# Patient Record
Sex: MALE | Race: WHITE | Employment: OTHER | URBAN - METROPOLITAN AREA
[De-identification: names, ages, dates, MRNs, and addresses within clinical notes are randomized per-mention and may not be internally consistent; named-entity substitution may affect disease eponyms.]

---

## 2020-01-27 RX ORDER — ZOLPIDEM TARTRATE 10 MG/1
TABLET ORAL NIGHTLY PRN
COMMUNITY

## 2020-01-27 RX ORDER — OLMESARTAN MEDOXOMIL AND HYDROCHLOROTHIAZIDE 20/12.5 20; 12.5 MG/1; MG/1
1 TABLET ORAL DAILY
COMMUNITY

## 2020-01-27 RX ORDER — AMLODIPINE BESYLATE 5 MG/1
5 TABLET ORAL DAILY
COMMUNITY

## 2020-01-27 RX ORDER — CHOLECALCIFEROL (VITAMIN D3) 125 MCG
5 CAPSULE ORAL NIGHTLY
COMMUNITY

## 2020-01-27 NOTE — PROGRESS NOTES
Name_______________________________________Printed:____________________  Date and time of surgery__1/31/2020  1200______________________Arrival Time:___[facility]  1030_____________   1. The instructions given regarding when and if a patient needs to stop oral intake prior to surgery varies. Follow the specific instructions you were given                XXX  ___Nothing to eat or to drink after Midnight the night before.                             ____Endoscopy patient follow your DRS instructions-generally you will be doing a part of the prep after Midnight                   ____Carbo loading or ERAS instructions will be given to select patients-if you have been given those instructions -please do the following                           The evening before your surgery after dinner before midnight drink 2 20 ounces of gatorade. If you are diabetic use sugar free. The morning of surgery drink 40 ounces of water. This needs to be finished 3 hours prior to your surgery start time. 2. Take the following pills with a small sip of water on the morning of surgery________amlodipine, benicar hct___________________________________________                  Do not take blood pressure medications ending in pril or sartan the leonardo prior to surgery or the morning of surgery_   3. Aspirin, Ibuprofen, Advil, Naproxen, Vitamin E and other Anti-inflammatory products and supplements should be stopped for 5 -7days before surgery or as directed by your physician. 4. Check with your Doctor regarding stopping Plavix, Coumadin,Eliquis, Lovenox,Effient,Pradaxa,Xarelto, Fragmin or other blood thinners and follow their instructions. 5. Do not smoke, and do not drink any alcoholic beverages 24 hours prior to surgery. This includes NA Beer. Refrain from the usage of any recreational drugs. 6. You may brush your teeth and gargle the morning of surgery. DO NOT SWALLOW WATER   7.  You MUST make arrangements for a responsible adult to stay on site

## 2020-01-31 ENCOUNTER — ANESTHESIA (OUTPATIENT)
Dept: ENDOSCOPY | Age: 85
End: 2020-01-31
Payer: MEDICARE

## 2020-01-31 ENCOUNTER — ANESTHESIA EVENT (OUTPATIENT)
Dept: ENDOSCOPY | Age: 85
End: 2020-01-31
Payer: MEDICARE

## 2020-01-31 ENCOUNTER — HOSPITAL ENCOUNTER (OUTPATIENT)
Age: 85
Setting detail: OUTPATIENT SURGERY
Discharge: HOME | End: 2020-01-31
Attending: INTERNAL MEDICINE | Admitting: INTERNAL MEDICINE
Payer: MEDICARE

## 2020-01-31 VITALS
WEIGHT: 175 LBS | DIASTOLIC BLOOD PRESSURE: 83 MMHG | RESPIRATION RATE: 16 BRPM | HEART RATE: 73 BPM | BODY MASS INDEX: 25.05 KG/M2 | HEIGHT: 70 IN | SYSTOLIC BLOOD PRESSURE: 184 MMHG | OXYGEN SATURATION: 100 % | TEMPERATURE: 97.3 F

## 2020-01-31 VITALS
RESPIRATION RATE: 12 BRPM | SYSTOLIC BLOOD PRESSURE: 152 MMHG | OXYGEN SATURATION: 98 % | DIASTOLIC BLOOD PRESSURE: 69 MMHG

## 2020-01-31 LAB
GLUCOSE BLD-MCNC: 122 MG/DL (ref 70–99)
GLUCOSE BLD-MCNC: 143 MG/DL (ref 70–99)
PERFORMED ON: ABNORMAL
PERFORMED ON: ABNORMAL

## 2020-01-31 PROCEDURE — 3700000000 HC ANESTHESIA ATTENDED CARE: Performed by: INTERNAL MEDICINE

## 2020-01-31 PROCEDURE — 7100000011 HC PHASE II RECOVERY - ADDTL 15 MIN: Performed by: INTERNAL MEDICINE

## 2020-01-31 PROCEDURE — 7100000010 HC PHASE II RECOVERY - FIRST 15 MIN: Performed by: INTERNAL MEDICINE

## 2020-01-31 PROCEDURE — 2500000003 HC RX 250 WO HCPCS: Performed by: NURSE ANESTHETIST, CERTIFIED REGISTERED

## 2020-01-31 PROCEDURE — 3609012400 HC EGD TRANSORAL BIOPSY SINGLE/MULTIPLE: Performed by: INTERNAL MEDICINE

## 2020-01-31 PROCEDURE — 3700000001 HC ADD 15 MINUTES (ANESTHESIA): Performed by: INTERNAL MEDICINE

## 2020-01-31 PROCEDURE — 88305 TISSUE EXAM BY PATHOLOGIST: CPT

## 2020-01-31 PROCEDURE — 2709999900 HC NON-CHARGEABLE SUPPLY: Performed by: INTERNAL MEDICINE

## 2020-01-31 PROCEDURE — 6360000002 HC RX W HCPCS: Performed by: NURSE ANESTHETIST, CERTIFIED REGISTERED

## 2020-01-31 PROCEDURE — 2580000003 HC RX 258: Performed by: ANESTHESIOLOGY

## 2020-01-31 RX ORDER — PROPOFOL 10 MG/ML
INJECTION, EMULSION INTRAVENOUS PRN
Status: DISCONTINUED | OUTPATIENT
Start: 2020-01-31 | End: 2020-01-31 | Stop reason: SDUPTHER

## 2020-01-31 RX ORDER — GLYCOPYRROLATE 0.2 MG/ML
INJECTION INTRAMUSCULAR; INTRAVENOUS PRN
Status: DISCONTINUED | OUTPATIENT
Start: 2020-01-31 | End: 2020-01-31 | Stop reason: SDUPTHER

## 2020-01-31 RX ORDER — SODIUM CHLORIDE 9 MG/ML
INJECTION, SOLUTION INTRAVENOUS CONTINUOUS
Status: DISCONTINUED | OUTPATIENT
Start: 2020-01-31 | End: 2020-01-31 | Stop reason: HOSPADM

## 2020-01-31 RX ORDER — OMEPRAZOLE 40 MG/1
40 CAPSULE, DELAYED RELEASE ORAL
Qty: 90 CAPSULE | Refills: 3 | Status: SHIPPED | OUTPATIENT
Start: 2020-01-31

## 2020-01-31 RX ORDER — LIDOCAINE HYDROCHLORIDE 20 MG/ML
INJECTION, SOLUTION INFILTRATION; PERINEURAL PRN
Status: DISCONTINUED | OUTPATIENT
Start: 2020-01-31 | End: 2020-01-31 | Stop reason: SDUPTHER

## 2020-01-31 RX ADMIN — PROPOFOL 30 MG: 10 INJECTION, EMULSION INTRAVENOUS at 12:28

## 2020-01-31 RX ADMIN — GLYCOPYRROLATE 0.2 MG: 0.2 INJECTION INTRAMUSCULAR; INTRAVENOUS at 12:23

## 2020-01-31 RX ADMIN — LIDOCAINE HYDROCHLORIDE 100 MG: 20 INJECTION, SOLUTION INFILTRATION; PERINEURAL at 12:24

## 2020-01-31 RX ADMIN — SODIUM CHLORIDE: 9 INJECTION, SOLUTION INTRAVENOUS at 10:57

## 2020-01-31 RX ADMIN — PROPOFOL 30 MG: 10 INJECTION, EMULSION INTRAVENOUS at 12:34

## 2020-01-31 RX ADMIN — PROPOFOL 30 MG: 10 INJECTION, EMULSION INTRAVENOUS at 12:25

## 2020-01-31 RX ADMIN — PROPOFOL 30 MG: 10 INJECTION, EMULSION INTRAVENOUS at 12:31

## 2020-01-31 ASSESSMENT — PAIN SCALES - GENERAL
PAINLEVEL_OUTOF10: 0

## 2020-01-31 ASSESSMENT — PAIN - FUNCTIONAL ASSESSMENT: PAIN_FUNCTIONAL_ASSESSMENT: 0-10

## 2020-01-31 ASSESSMENT — ENCOUNTER SYMPTOMS: SHORTNESS OF BREATH: 0

## 2020-01-31 NOTE — H&P
[address] [place] [address]   Pre-operative History and Physical    Patient: Diamond Golden  : 1929  CSN:     History Obtained From:  patient    HISTORY OF PRESENT ILLNESS:    The patient is a 80 y.o. male with dysphagia for solids x 1.5y    Past Medical History:        Diagnosis Date    Degenerative disc disease, lumbar     Diabetes mellitus ([address]     Hyperlipidemia     Hypertension     Insomnia     Urinary incontinence      Past Surgical History:        Procedure Laterality Date    BACK SURGERY      disectomy x 2    CATARACT REMOVAL WITH IMPLANT Bilateral     LUNG REMOVAL, PARTIAL      due to TB    MOUTH SURGERY      NASAL SINUS SURGERY      polypectomy    TESTICLE REMOVAL      TONSILLECTOMY      TURP       Medications Prior to Admission:   No current facility-administered medications on file prior to encounter. Current Outpatient Medications on File Prior to Encounter   Medication Sig Dispense Refill    metFORMIN (GLUCOPHAGE) 500 MG tablet Take 500 mg by mouth 2 times daily (with meals)      linagliptin (TRADJENTA) 5 MG tablet Take 5 mg by mouth daily      amLODIPine (NORVASC) 5 MG tablet Take 5 mg by mouth daily      olmesartan-hydrochlorothiazide (BENICAR HCT) 20-12.5 MG per tablet Take 1 tablet by mouth daily      zolpidem (AMBIEN) 10 MG tablet Take by mouth nightly as needed for Sleep.  melatonin 5 MG TABS tablet Take 5 mg by mouth nightly      NONFORMULARY [employer] all day allergy 10mg daily      aspirin 81 MG tablet Take 81 mg by mouth daily       Allergies:  Actos [pioglitazone]; Kombiglyze xr [saxagliptin-metformin er]; Lipitor [atorvastatin calcium]; and Onglyza [saxagliptin]    History of allergic reaction to anesthesia:  No    Social History:   Reviewed   Family History:   History reviewed. No pertinent family history.     PHYSICAL EXAM:      BP (!) 178/69   Pulse 74   Temp 97.2 °F (36.2 °C) (Temporal)   Resp 18   Ht 5' 9.5\" (1.765 m)   Wt 175 lb (79.4 kg) SpO2 99%   BMI 25.47 kg/m²  I        Heart:  Normal apical impulse, regular rate and rhythm, normal S1 and S2, no S3 or S4, and no murmur noted    Lungs:  No increased work of breathing, good air exchange, clear to auscultation bilaterally, no crackles or wheezing    Abdomen:  No scars, normal bowel sounds, soft, non-distended, non-tender, no masses palpated, no hepatosplenomegally      ASA Grade:  ASA 2 - Patient with mild systemic disease with no functional limitations    Mallampati Class:  Class I: Soft palate, uvula, fauces, pillars visible  __x________  Class II: Soft palate, uvula, fauces visible  __________   Class III: Soft palate, base of uvula visible  __________  Class IV: Hard palate only visible   __________      ASSESSMENT AND PLAN:    1. Patient is a 80 y.o. male here for EGD with anesthesia  2. Procedure options, risks and benefits reviewed with patient. Patient expresses understanding.

## 2020-01-31 NOTE — ANESTHESIA PRE PROCEDURE
Department of Anesthesiology  Preprocedure Note       Name:  Scot Santos   Age:  80 y.o.  :  1929                                          MRN:  [id number]         Date:  2020      Surgeon: Samantha Carlson):  Carlos Alberto Rico MD    Procedure: EGD DIAGNOSTIC ONLY (N/A Abdomen)    Medications prior to admission:   Prior to Admission medications    Medication Sig Start Date End Date Taking? Authorizing Provider   metFORMIN (GLUCOPHAGE) 500 MG tablet Take 500 mg by mouth 2 times daily (with meals)   Yes Historical Provider, MD   linagliptin (TRADJENTA) 5 MG tablet Take 5 mg by mouth daily   Yes Historical Provider, MD   amLODIPine (NORVASC) 5 MG tablet Take 5 mg by mouth daily   Yes Historical Provider, MD   olmesartan-hydrochlorothiazide (BENICAR HCT) 20-12.5 MG per tablet Take 1 tablet by mouth daily   Yes Historical Provider, MD   zolpidem (AMBIEN) 10 MG tablet Take by mouth nightly as needed for Sleep. Yes Historical Provider, MD   melatonin 5 MG TABS tablet Take 5 mg by mouth nightly   Yes Historical Provider, MD   NONFORMULARY [employer] all day allergy 10mg daily    Historical Provider, MD   aspirin 81 MG tablet Take 81 mg by mouth daily    Historical Provider, MD       Current medications:    Current Facility-Administered Medications   Medication Dose Route Frequency Provider Last Rate Last Dose    0.9 % sodium chloride infusion   Intravenous Continuous Torito Cabrera  mL/hr at 20 1057         Allergies: Allergies   Allergen Reactions    Actos [Pioglitazone]      Feet swelling    Kombiglyze Xr [Saxagliptin-Metformin Er]      Feet swelling    Lipitor [Atorvastatin Calcium]      Muscle weakness    Onglyza [Saxagliptin]      Feet swelling       Problem List:  There is no problem list on file for this patient.       Past Medical History:        Diagnosis Date    Degenerative disc disease, lumbar     Diabetes mellitus ([facility]     Hyperlipidemia     Hypertension     Insomnia

## 2020-01-31 NOTE — ANESTHESIA POSTPROCEDURE EVALUATION
Department of Anesthesiology  Postprocedure Note    Patient: Mallory Saavedra  MRN: [id number]  YOB: 1929  Date of evaluation: 1/31/2020  Time:  12:58 PM     Procedure Summary     Date:  01/31/20 Room / Location:  [address] [place] [address]    Anesthesia Start:  0973 Anesthesia Stop:  2995    Procedure:  EGD BIOPSY (N/A Abdomen) Diagnosis:  (ESOPHAGEAL DYSPHAGIA R13.10)    Surgeon:  Eddie Cool MD Responsible Provider:  Ruth Muñoz MD    Anesthesia Type:  MAC ASA Status:  2          Anesthesia Type: MAC    Angelia Phase I: Angelia Score: 10    Angelia Phase II:      Last vitals: Reviewed and per EMR flowsheets.        Anesthesia Post Evaluation    Patient location during evaluation: PACU  Patient participation: complete - patient participated  Level of consciousness: awake and alert  Airway patency: patent  Nausea & Vomiting: no nausea and no vomiting  Complications: no  Cardiovascular status: hemodynamically stable  Respiratory status: acceptable  Hydration status: stable

## 2020-09-11 ENCOUNTER — HOSPITAL ENCOUNTER (EMERGENCY)
Age: 85
Discharge: HOME | End: 2020-09-11
Attending: EMERGENCY MEDICINE
Payer: MEDICARE

## 2020-09-11 VITALS
RESPIRATION RATE: 20 BRPM | WEIGHT: 162 LBS | OXYGEN SATURATION: 99 % | SYSTOLIC BLOOD PRESSURE: 160 MMHG | DIASTOLIC BLOOD PRESSURE: 84 MMHG | HEIGHT: 70 IN | HEART RATE: 76 BPM | TEMPERATURE: 97.6 F | BODY MASS INDEX: 23.19 KG/M2

## 2020-09-11 PROCEDURE — 99282 EMERGENCY DEPT VISIT SF MDM: CPT

## 2020-09-11 PROCEDURE — 6370000000 HC RX 637 (ALT 250 FOR IP): Performed by: EMERGENCY MEDICINE

## 2020-09-11 RX ORDER — TETRACAINE HYDROCHLORIDE 5 MG/ML
1 SOLUTION OPHTHALMIC ONCE
Status: COMPLETED | OUTPATIENT
Start: 2020-09-11 | End: 2020-09-11

## 2020-09-11 RX ORDER — CIPROFLOXACIN HYDROCHLORIDE 3.5 MG/ML
1 SOLUTION/ DROPS TOPICAL EVERY 4 HOURS
Qty: 42 DROP | Refills: 0 | Status: SHIPPED | OUTPATIENT
Start: 2020-09-11 | End: 2020-09-18

## 2020-09-11 RX ADMIN — FLUORESCEIN SODIUM 1 MG: 1 STRIP OPHTHALMIC at 12:42

## 2020-09-11 RX ADMIN — TETRACAINE HYDROCHLORIDE 1 DROP: 5 SOLUTION OPHTHALMIC at 12:42

## 2020-09-11 ASSESSMENT — VISUAL ACUITY
OU: 20/30
OD: 20/40
OS: 20/50

## 2020-09-11 ASSESSMENT — PAIN SCALES - GENERAL
PAINLEVEL_OUTOF10: 2
PAINLEVEL_OUTOF10: 0

## 2020-09-11 ASSESSMENT — PAIN DESCRIPTION - PAIN TYPE: TYPE: ACUTE PAIN

## 2020-09-11 ASSESSMENT — PAIN - FUNCTIONAL ASSESSMENT: PAIN_FUNCTIONAL_ASSESSMENT: ACTIVITIES ARE NOT PREVENTED

## 2020-09-11 ASSESSMENT — PAIN DESCRIPTION - ORIENTATION: ORIENTATION: RIGHT

## 2020-09-11 ASSESSMENT — PAIN DESCRIPTION - FREQUENCY: FREQUENCY: CONTINUOUS

## 2020-09-11 ASSESSMENT — PAIN DESCRIPTION - LOCATION: LOCATION: EYE

## 2020-09-11 ASSESSMENT — PAIN DESCRIPTION - PROGRESSION: CLINICAL_PROGRESSION: NOT CHANGED

## 2020-09-11 NOTE — ED PROVIDER NOTES
CHIEF COMPLAINT  Chief Complaint   Patient presents with    Eye Problem     Irritated right eye for two weeks. Now the left eye is bothering him. HISTORY OF PRESENT ILLNESS  Valente Alanis is a 80 y.o. male who presents to the ED complaining of an irritated right eye for the last 2 weeks with increasing redness and watering but no photophobia. Patient reports that he was mowing the grass approximately 5 days ago when a branch hit him in the eye. He has a foreign body sensation in the right lower eye. There is no periorbital swelling or redness. No fevers or chills. No loss of vision, flashing lights or blind spots. No headache. No halos    No other complaints, modifying factors or associated symptoms. Nursing notes reviewed. Past Medical History:   Diagnosis Date    Degenerative disc disease, lumbar     Diabetes mellitus ([address]     Hyperlipidemia     Hypertension     Insomnia     Urinary incontinence      Past Surgical History:   Procedure Laterality Date    BACK SURGERY      disectomy x 2    CATARACT REMOVAL WITH IMPLANT Bilateral     LUNG REMOVAL, PARTIAL      due to TB    MOUTH SURGERY      NASAL SINUS SURGERY      polypectomy    TESTICLE REMOVAL      TONSILLECTOMY      TURP      UPPER GASTROINTESTINAL ENDOSCOPY N/A 1/31/2020    EGD BIOPSY performed by Sima Mcghee MD at [address] [place] [address]     History reviewed. No pertinent family history. Social History     Socioeconomic History    Marital status:       Spouse name: Not on file    Number of children: Not on file    Years of education: Not on file    Highest education level: Not on file   Occupational History    Not on file   Social Needs    Financial resource strain: Not on file    Food insecurity     Worry: Not on file     Inability: Not on file    Transportation needs     Medical: Not on file     Non-medical: Not on file   Tobacco Use    Smoking status: Former Smoker    Smokeless tobacco: Never Used Substance and Sexual Activity    Alcohol use: Yes     Alcohol/week: 6.0 standard drinks     Types: 5 Glasses of wine, 1 Shots of liquor per week     Comment: weekly    Drug use: Never    Sexual activity: Not on file   Lifestyle    Physical activity     Days per week: Not on file     Minutes per session: Not on file    Stress: Not on file   Relationships    Social connections     Talks on phone: Not on file     Gets together: Not on file     Attends Judaism service: Not on file     Active member of club or organization: Not on file     Attends meetings of clubs or organizations: Not on file     Relationship status: Not on file    Intimate partner violence     Fear of current or ex partner: Not on file     Emotionally abused: Not on file     Physically abused: Not on file     Forced sexual activity: Not on file   Other Topics Concern    Not on file   Social History Narrative    Not on file     No current facility-administered medications for this encounter. Current Outpatient Medications   Medication Sig Dispense Refill    ciprofloxacin (CILOXAN) 0.3 % ophthalmic solution Place 1 drop into the right eye every 4 hours for 7 days 42 drop 0    omeprazole (PRILOSEC) 40 MG delayed release capsule Take 1 capsule by mouth every morning (before breakfast) 90 capsule 3    metFORMIN (GLUCOPHAGE) 500 MG tablet Take 500 mg by mouth 2 times daily (with meals)      linagliptin (TRADJENTA) 5 MG tablet Take 5 mg by mouth daily      amLODIPine (NORVASC) 5 MG tablet Take 5 mg by mouth daily      olmesartan-hydrochlorothiazide (BENICAR HCT) 20-12.5 MG per tablet Take 1 tablet by mouth daily      zolpidem (AMBIEN) 10 MG tablet Take by mouth nightly as needed for Sleep.       aspirin 81 MG tablet Take 81 mg by mouth daily      melatonin 5 MG TABS tablet Take 5 mg by mouth nightly      NONFORMULARY [employer] all day allergy 10mg daily       Allergies   Allergen Reactions    Actos [Pioglitazone]      Feet swelling with the patient and advised to follow up with primary care physician to further assess and treat hypertension in the outpatient setting. The patient's blood pressure was found to be elevated according to CMS/Medicare and the Affordable Care Act/ObamaCare criteria. Elevated blood pressure could occur because of pain or anxiety or other reasons and does not mean that they need to have their blood pressure treated or medications otherwise adjusted. However, this could also be a sign that they will need to have their blood pressure treated or medications changed. The patient was instructed to take a list of recent blood pressure readings to their next visit with their personal physician. Patient was given scripts for the following medications. I counseled patient how to take these medications. New Prescriptions    CIPROFLOXACIN (CILOXAN) 0.3 % OPHTHALMIC SOLUTION    Place 1 drop into the right eye every 4 hours for 7 days         CLINICAL IMPRESSION  1. Abrasion of sclera of right eye, initial encounter        Blood pressure (!) 160/84, pulse 76, temperature 97.6 °F (36.4 °C), temperature source Oral, resp. rate 20, height 5' 10\" (1.778 m), weight 162 lb (73.5 kg), SpO2 99 %.       Follow-up with:  Karlie Nunez, [address] [place] [address]  [phone]    In 1 week  If symptoms worsen          Teresita Goncalves MD  09/11/20 9577

## 2020-09-11 NOTE — ED TRIAGE NOTES
Patient states he has had irritated feeling in his right eye for two weeks. Now the left eye is becoming irritated too. \"It feels like there is a hair in my right eye. \"  His eyes have been watering. More redness noted in the right eye.

## 2021-03-13 ENCOUNTER — APPOINTMENT (OUTPATIENT)
Dept: GENERAL RADIOLOGY | Age: 86
End: 2021-03-13
Payer: MEDICARE

## 2021-03-13 ENCOUNTER — HOSPITAL ENCOUNTER (EMERGENCY)
Age: 86
Discharge: HOME | End: 2021-03-13
Attending: EMERGENCY MEDICINE
Payer: MEDICARE

## 2021-03-13 VITALS
BODY MASS INDEX: 23.7 KG/M2 | WEIGHT: 160 LBS | HEART RATE: 88 BPM | DIASTOLIC BLOOD PRESSURE: 85 MMHG | SYSTOLIC BLOOD PRESSURE: 146 MMHG | HEIGHT: 69 IN | TEMPERATURE: 97.7 F | OXYGEN SATURATION: 99 % | RESPIRATION RATE: 18 BRPM

## 2021-03-13 DIAGNOSIS — R09.89 GLOBUS SENSATION: Primary | ICD-10-CM

## 2021-03-13 LAB — S PYO AG THROAT QL: NEGATIVE

## 2021-03-13 PROCEDURE — 99283 EMERGENCY DEPT VISIT LOW MDM: CPT

## 2021-03-13 PROCEDURE — 87081 CULTURE SCREEN ONLY: CPT

## 2021-03-13 PROCEDURE — 71046 X-RAY EXAM CHEST 2 VIEWS: CPT

## 2021-03-13 PROCEDURE — 87880 STREP A ASSAY W/OPTIC: CPT

## 2021-03-13 NOTE — ED PROVIDER NOTES
CHIEF COMPLAINT  Pharyngitis (onset this morning)      HISTORY OF PRESENT ILLNESS  Michelle Lechuga is a 80 y.o. male who presents to the ED complaining of sore throat that started this morning. Patient states he felt some scratchiness in the back of the throat and waking up. Denies any difficulty swallowing. States he was able to eat some coffee cake and drink some juice this morning. States he had to clear his throat this morning but denies any actual cough or sputum production. No chest pain or shortness of breath. No fevers or chills. Denies any known sick contacts. Patient states he is feeling anxious because his neighbor recently passed away and he thinks he is getting paranoid that he may be sick. No nausea or vomiting. No abdominal pain. Normal urinary and bowel habits. No medication changes. No rashes. No other complaints, modifying factors or associated symptoms. Nursing notes reviewed. Past Medical History:   Diagnosis Date    Degenerative disc disease, lumbar     Diabetes mellitus ([address]     Hyperlipidemia     Hypertension     Insomnia     Urinary incontinence      Past Surgical History:   Procedure Laterality Date    BACK SURGERY      disectomy x 2    CATARACT REMOVAL WITH IMPLANT Bilateral     LUNG REMOVAL, PARTIAL      due to TB    MOUTH SURGERY      NASAL SINUS SURGERY      polypectomy    TESTICLE REMOVAL      TONSILLECTOMY      TURP      UPPER GASTROINTESTINAL ENDOSCOPY N/A 1/31/2020    EGD BIOPSY performed by Blanca Dubon MD at [address]     History reviewed. No pertinent family history. Social History     Socioeconomic History    Marital status:       Spouse name: Not on file    Number of children: Not on file    Years of education: Not on file    Highest education level: Not on file   Occupational History    Not on file   Social Needs    Financial resource strain: Not on file    Food insecurity     Worry: Not on file     Inability: Not on file   [employer] needs     Medical: Not on file     Non-medical: Not on file   Tobacco Use    Smoking status: Former Smoker    Smokeless tobacco: Never Used   Substance and Sexual Activity    Alcohol use: Yes     Alcohol/week: 6.0 standard drinks     Types: 5 Glasses of wine, 1 Shots of liquor per week     Comment: weekly    Drug use: Never    Sexual activity: Not on file   Lifestyle    Physical activity     Days per week: Not on file     Minutes per session: Not on file    Stress: Not on file   Relationships    Social connections     Talks on phone: Not on file     Gets together: Not on file     Attends Orthodox service: Not on file     Active member of club or organization: Not on file     Attends meetings of clubs or organizations: Not on file     Relationship status: Not on file    Intimate partner violence     Fear of current or ex partner: Not on file     Emotionally abused: Not on file     Physically abused: Not on file     Forced sexual activity: Not on file   Other Topics Concern    Not on file   Social History Narrative    Not on file     No current facility-administered medications for this encounter. Current Outpatient Medications   Medication Sig Dispense Refill    omeprazole (PRILOSEC) 40 MG delayed release capsule Take 1 capsule by mouth every morning (before breakfast) 90 capsule 3    metFORMIN (GLUCOPHAGE) 500 MG tablet Take 500 mg by mouth 2 times daily (with meals)      linagliptin (TRADJENTA) 5 MG tablet Take 5 mg by mouth daily      amLODIPine (NORVASC) 5 MG tablet Take 5 mg by mouth daily      olmesartan-hydrochlorothiazide (BENICAR HCT) 20-12.5 MG per tablet Take 1 tablet by mouth daily      zolpidem (AMBIEN) 10 MG tablet Take by mouth nightly as needed for Sleep.       NONFORMULARY [employer] all day allergy 10mg daily      aspirin 81 MG tablet Take 81 mg by mouth daily      melatonin 5 MG TABS tablet Take 5 mg by mouth nightly       Allergies   Allergen Reactions    Actos [Pioglitazone]      Feet swelling    Kombiglyze Xr [Saxagliptin-Metformin Er]      Feet swelling    Lipitor [Atorvastatin Calcium]      Muscle weakness    Onglyza [Saxagliptin]      Feet swelling         REVIEW OF SYSTEMS  10 systems reviewed, pertinent positives per HPI otherwise noted to be negative    PHYSICAL EXAM  BP (!) 146/85   Pulse 88   Temp 97.7 °F (36.5 °C) (Oral)   Resp 18   Ht 5' 9\" (1.753 m)   Wt 160 lb (72.6 kg)   SpO2 99%   BMI 23.63 kg/m²      CONSTITUTIONAL: AOx4, cooperative with exam, afebrile   HEAD: normocephalic, atraumatic   EYES: PERRL, EOMI, anicteric sclera   ENT: Moist mucous membranes, uvula midline, no swelling of the the oropharynx, no swelling of the uvula, no drooling, no muffled voice   NECK: Supple, symmetric, trachea midline, full range of motion, no meningismus, no lymphadenopathy   LUNGS: Bilateral breath sounds, CTAB, no rales/ronchi/wheezes   CARDIOVASCULAR: RRR, normal S1/S2, no m/r/g, 2+ pulses throughout   ABDOMEN: Soft, non-tender, non-distended, +BS   NEUROLOGIC:  MAEx4, GCS 15, alert and oriented x4, 5/5 strength throughout, light touch sensation grossly intact, cranial nerves II through XII intact   MUSCULOSKELETAL: No clubbing, cyanosis or edema   SKIN: No rash, pallor or wounds on exposed surfaces         RADIOLOGY  X-RAYS:  I have reviewed radiologic plain film image(s). ALL OTHER NON-PLAIN FILM IMAGES SUCH AS CT, ULTRASOUND AND MRI HAVE BEEN READ BY THE RADIOLOGIST. XR CHEST (2 VW)   Final Result   No acute process. EKG INTERPRETATION  None    PROCEDURES    ED COURSE/MDM  Viral pharyngitis, postnasal drip, nasal congestion, anxiety, esophagitis, globus sensation  Patient seen and evaluated. History and physical as above. Nontoxic, afebrile. Patient with complaints of globus sensation and possible scratchy sore throat. Patient feeling anxious after his neighbor passed away recently.   States he was able to eat and drink normally this morning without any difficulty. Tolerating his secretions. No fevers or chills. Full range of motion of the neck. Possible globus sensation secondary to anxiety. Strep swab negative. Chest x-ray obtained and shows no evidence of pneumonia or infiltrate. Patient's vital signs stable with no fever or tachypnea. Patient updated on results and provided reassurance. Discussed close follow-up with his primary care physician this coming Monday. Patient agreeable. Strict return instructions provided. All questions answered prior to discharge. I estimate there is LOW risk for a ANAPHYLAXIS, DEEP SPACE INFECTION (e.g., YESSICAS ANGINA OR RETROPHARYNGEAL ABSCESS), EPIGLOTTITIS, MENINGITIS, or AIRWAY COMPROMISE, thus I consider the discharge disposition reasonable. Also, there is no evidence or peritonitis, sepsis, or toxicity. Mariposa Walsh and I have discussed the diagnosis and risks, and we agree with discharging home to follow-up with their primary doctor. We also discussed returning to the Emergency Department immediately if new or worsening symptoms occur. We have discussed the symptoms which are most concerning (e.g., changing or worsening pain, trouble swallowing or breathing, neck stiffness or fever) that necessitate immediate return. Patient was given scripts for the following medications. I counseled patient how to take these medications. New Prescriptions    No medications on file           CLINICAL IMPRESSION  1. Globus sensation        Blood pressure (!) 146/85, pulse 88, temperature 97.7 °F (36.5 °C), temperature source Oral, resp. rate 18, height 5' 9\" (1.753 m), weight 160 lb (72.6 kg), SpO2 99 %. DISPOSITION  Patient was discharged to home in good condition. [facility], [address] [place] [address]  [phone]    Call today  For a follow up appointment. Disclaimer: All medical record entries made by [employer] dictation.       (Please note that this note was completed with a voice recognition program. Every attempt was made to edit the dictations, but inevitably there remain words that are mis-transcribed.)            Trevon Chi MD  03/13/21 6333

## 2021-03-13 NOTE — ED NOTES
Noticed that his throat was \"tight\" and a little sore this morning. No fever or headache. For the last few weeks pt says he has had a cough in the morning. Denies N/V/D.      [address], [address] [place] [address]  03/13/21 3644

## 2021-03-15 LAB — S PYO THROAT QL CULT: NORMAL

## 2021-10-20 ENCOUNTER — HOSPITAL ENCOUNTER (EMERGENCY)
Age: 86
Discharge: HOME | End: 2021-10-20
Attending: EMERGENCY MEDICINE
Payer: MEDICARE

## 2021-10-20 VITALS
DIASTOLIC BLOOD PRESSURE: 85 MMHG | RESPIRATION RATE: 16 BRPM | SYSTOLIC BLOOD PRESSURE: 183 MMHG | HEART RATE: 88 BPM | OXYGEN SATURATION: 96 % | WEIGHT: 162 LBS | HEIGHT: 70 IN | BODY MASS INDEX: 23.19 KG/M2 | TEMPERATURE: 97.2 F

## 2021-10-20 DIAGNOSIS — S60.222A CONTUSION OF LEFT HAND, INITIAL ENCOUNTER: Primary | ICD-10-CM

## 2021-10-20 DIAGNOSIS — S61.412A LACERATION OF LEFT HAND WITHOUT FOREIGN BODY, INITIAL ENCOUNTER: ICD-10-CM

## 2021-10-20 PROCEDURE — 6360000002 HC RX W HCPCS: Performed by: EMERGENCY MEDICINE

## 2021-10-20 PROCEDURE — 90715 TDAP VACCINE 7 YRS/> IM: CPT | Performed by: EMERGENCY MEDICINE

## 2021-10-20 PROCEDURE — 90471 IMMUNIZATION ADMIN: CPT | Performed by: EMERGENCY MEDICINE

## 2021-10-20 PROCEDURE — 99283 EMERGENCY DEPT VISIT LOW MDM: CPT

## 2021-10-20 RX ADMIN — TETANUS TOXOID, REDUCED DIPHTHERIA TOXOID AND ACELLULAR PERTUSSIS VACCINE, ADSORBED 0.5 ML: 5; 2.5; 8; 8; 2.5 SUSPENSION INTRAMUSCULAR at 12:04

## 2021-10-20 ASSESSMENT — PAIN SCALES - GENERAL: PAINLEVEL_OUTOF10: 0

## 2021-10-20 NOTE — ED NOTES
Alert and oriented x 4, ambulatory to room with wheeled walker. He lives alone and drove self to ED. Last Saturday he was going upstairs and his knee buckled and senior living up he fell down 7 steps. He denies LOSS OF CONSCIOUSNESS, denies neck pain, no hip pain. States that he is a diabetic and here for left hand skin tear  Oozing small amount of yellow pus, Denies fever, no chills, Calm, pleasant, cooperative. Has foll ROM on left hand noted purple bruising on hand with soft hematoma dorsal area. Strong radial pulse.      Hector Tee RN  10/20/21 5789

## 2021-10-20 NOTE — ED NOTES
Wound cleansed with saline/chlorhexidine, then mepitel gauze/antibiotic ointment and bandage/strapping ace wrap applied, tolerated well.  Would care instructions given, voiced understanding     Azam Levy RN  10/20/21 3984

## 2021-10-20 NOTE — ED PROVIDER NOTES
CHIEF COMPLAINT  Chief Complaint   Patient presents with    Hand Injury     left hand skin tear and injury sustained last Saturday s/p fal \"knee buckled\" He was going upstairs half a flight and fell (7 steps). Denies LOC denies other injury. HISTORY OF PRESENT ILLNESS  Mariaelena Bustamante is a 80 y.o. male who presents to the ED complaining of 5-day history of bruising of the dorsal aspect of the left hand and skin tear that he incurred when he fell while walking up the steps. Patient reports no pain of the hand. No loss of flexor or extensor tendon function at the hand. Tetanus status is not up-to-date. No signs of secondary infection with no red streaks, fever or purulent discharge. No other complaints, modifying factors or associated symptoms. Nursing notes reviewed. Past Medical History:   Diagnosis Date    Degenerative disc disease, lumbar     Diabetes mellitus ([address]     Hyperlipidemia     Hypertension     Insomnia     Urinary incontinence      Past Surgical History:   Procedure Laterality Date    BACK SURGERY      disectomy x 2    CATARACT REMOVAL WITH IMPLANT Bilateral     LUNG REMOVAL, PARTIAL      due to TB    MOUTH SURGERY      NASAL SINUS SURGERY      polypectomy    TESTICLE REMOVAL      TONSILLECTOMY      TURP      UPPER GASTROINTESTINAL ENDOSCOPY N/A 1/31/2020    EGD BIOPSY performed by Claudette Furrow, MD at [address] [place] [address]     History reviewed. No pertinent family history. Social History     Socioeconomic History    Marital status:      Spouse name: Not on file    Number of children: Not on file    Years of education: Not on file    Highest education level: Not on file   Occupational History    Not on file   Tobacco Use    Smoking status: Former Smoker    Smokeless tobacco: Never Used   Substance and Sexual Activity    Alcohol use:  Yes     Alcohol/week: 6.0 standard drinks     Types: 5 Glasses of wine, 1 Shots of liquor per week     Comment: weekly    Drug use: Never    Sexual activity: Not on file   Other Topics Concern    Not on file   Social History Narrative    Not on file     Social Determinants of Health     Financial Resource Strain:     Difficulty of Paying Living Expenses:    Food Insecurity:     Worried About Running Out of Food in the Last Year:     [address] in the Last Year:    Transportation Needs:     Lack of Transportation (Medical):  Lack of Transportation (Non-Medical):    Physical Activity:     Days of Exercise per Week:     Minutes of Exercise per Session:    Stress:     Feeling of Stress :    Social Connections:     Frequency of Communication with Friends and Family:     Frequency of Social Gatherings with Friends and Family:     Attends [employer] Services:     Active Member of Clubs or Organizations:     Attends Club or Organization Meetings:     Marital Status:    Intimate Partner Violence:     Fear of Current or Ex-Partner:     Emotionally Abused:     Physically Abused:     Sexually Abused:      Current Facility-Administered Medications   Medication Dose Route Frequency Provider Last Rate Last Admin    Tetanus-Diphth-Acell Pertussis (BOOSTRIX) injection 0.5 mL  0.5 mL IntraMUSCular Once Ellen Arriaga MD         Current Outpatient Medications   Medication Sig Dispense Refill    omeprazole (PRILOSEC) 40 MG delayed release capsule Take 1 capsule by mouth every morning (before breakfast) 90 capsule 3    metFORMIN (GLUCOPHAGE) 500 MG tablet Take 500 mg by mouth 2 times daily (with meals)      linagliptin (TRADJENTA) 5 MG tablet Take 5 mg by mouth daily      amLODIPine (NORVASC) 5 MG tablet Take 5 mg by mouth daily      olmesartan-hydrochlorothiazide (BENICAR HCT) 20-12.5 MG per tablet Take 1 tablet by mouth daily      zolpidem (AMBIEN) 10 MG tablet Take by mouth nightly as needed for Sleep.       aspirin 81 MG tablet Take 81 mg by mouth daily      melatonin 5 MG TABS tablet Take 5 mg by mouth nightly  NONFORMULARY [employer] all day allergy 10mg daily       Allergies   Allergen Reactions    Actos [Pioglitazone]      Feet swelling    Kombiglyze Xr [Saxagliptin-Metformin Er]      Feet swelling    Lipitor [Atorvastatin Calcium]      Muscle weakness    Onglyza [Saxagliptin]      Feet swelling       REVIEW OF SYSTEMS  Positives and pertinent negatives as per HPI. Six other systems were reviewed and are negative. Nursing notes pertaining to ROS were reviewed. PHYSICAL EXAM   BP (!) 183/85   Pulse 88   Temp 97.2 °F (36.2 °C) (Tympanic)   Resp 16   Ht 5' 10\" (1.778 m)   Wt 162 lb (73.5 kg)   SpO2 96%   BMI 23.24 kg/m²   GENERAL APPEARANCE: Awake and alert. Cooperative. No acute distress. HEAD: Normocephalic. Atraumatic. EYES: PERRL. EOM's grossly intact. No scleral icterus, injection or exudate  ENT: Mucous membranes are moist.  NECK: Supple. Normal ROM. CHEST:  Regular rate and rhythm, no murmurs, rubs or gallops  LUNGS: Breathing is unlabored. Speaking comfortably in full sentences. Clear through auscultation bilaterally  ABDOMEN: Nondistended, nontender  EXTREMITIES: Left dorsal hand reveals small hematoma and several superficial skin tears with necrotic skin edges that were carefully debrided. Patient has no evidence of secondary infection and there is no tenderness to palpation of the left hand. Cap refill is less than 2 seconds of the left hand. Patient has intact flexor and extensor tendon strength of all digits of the left hand at all joints. SKIN: Warm and dry. NEUROLOGICAL: Alert and oriented. ED COURSE/MDM  Left hand contusion with superficial skin tears: Wound care was provided in the emergency room and wound was dressed with placement of an Ace wrap. RICE. Ibuprofen or Tylenol as needed. Tetanus status was updated. Follow-up with PCP in 1 week as needed. Hypertension:  Patient was hypertensive during the ER visit today.   There are no signs of hypertensive emergency or evidence of end organ damage by history or physical exam.  These findings were discussed with the patient and advised to follow up with primary care physician to further assess and treat hypertension in the outpatient setting. The patient's blood pressure was found to be elevated according to CMS/Medicare and the Affordable Care Act/[facility] criteria. Elevated blood pressure could occur because of pain or anxiety or other reasons and does not mean that they need to have their blood pressure treated or medications otherwise adjusted. However, this could also be a sign that they will need to have their blood pressure treated or medications changed. The patient was instructed to take a list of recent blood pressure readings to their next visit with their personal physician. Patient was given scripts for the following medications. I counseled patient how to take these medications. New Prescriptions    No medications on file         CLINICAL IMPRESSION  1. Contusion of left hand, initial encounter    2. Laceration of left hand without foreign body, initial encounter        Blood pressure (!) 183/85, pulse 88, temperature 97.2 °F (36.2 °C), temperature source Tympanic, resp. rate 16, height 5' 10\" (1.778 m), weight 162 lb (73.5 kg), SpO2 96 %.       Follow-up with:  [facility], [address] [place] [address]  [phone]    In 1 week  If symptoms worsen          Dheeraj Gonsales MD  10/20/21 9513

## 2021-10-20 NOTE — ED NOTES
Discharge and education instructions reviewed. Patient verbalized understanding, teach back successful. Patient denied questions at this time. Instructed to follow up with PCP and or return to ED if symptoms worsen.    Ambulatory to exit with walker, denies c/o extra gauze given, denies pain     Margaret Ko RN  10/20/21 4942

## (undated) DEVICE — FORCEPS BX L240CM WRK CHN 2.8MM STD CAP W/ NDL MIC MESH

## (undated) DEVICE — BW-412T DISP COMBO CLEANING BRUSH: Brand: SINGLE USE COMBINATION CLEANING BRUSH

## (undated) DEVICE — MOUTHPIECE ENDOSCP L CTRL OPN AND SIDE PORTS DISP

## (undated) DEVICE — SOLUTION IV IRRIG WATER 500ML POUR BRL ST 2F7113

## (undated) DEVICE — SET VLV 3 PC AWS DISPOSABLE GRDIAN SCOPEVALET

## (undated) DEVICE — PROCEDURE KIT ENDOSCP CUST